# Patient Record
Sex: MALE | Race: WHITE | NOT HISPANIC OR LATINO | Employment: FULL TIME | URBAN - METROPOLITAN AREA
[De-identification: names, ages, dates, MRNs, and addresses within clinical notes are randomized per-mention and may not be internally consistent; named-entity substitution may affect disease eponyms.]

---

## 2019-07-11 ENCOUNTER — TELEPHONE (OUTPATIENT)
Dept: URGENT CARE | Age: 29
End: 2019-07-11

## 2019-07-11 NOTE — TELEPHONE ENCOUNTER
Pt came into have sutures removed, over 50 on his head in total after having hair restoration done on 6/28  Due to the type of procedure that was done pt needs to return to his surgeon for the removal  Discussed with my collaborating physician and agreed that he would have to go to his surgeon and Dr Iveth Hutton also not comfortable removing them  Patient was offered a refund due to being unable to remove them and cancel his registration  Pt verbally stated that he understood and walked out       RENUKA Jacobsen

## 2021-03-31 DIAGNOSIS — Z23 ENCOUNTER FOR IMMUNIZATION: ICD-10-CM

## 2022-03-06 ENCOUNTER — OFFICE VISIT (OUTPATIENT)
Dept: URGENT CARE | Age: 32
End: 2022-03-06
Payer: COMMERCIAL

## 2022-03-06 VITALS
TEMPERATURE: 97.1 F | WEIGHT: 225 LBS | HEIGHT: 69 IN | DIASTOLIC BLOOD PRESSURE: 85 MMHG | SYSTOLIC BLOOD PRESSURE: 139 MMHG | HEART RATE: 73 BPM | BODY MASS INDEX: 33.33 KG/M2 | OXYGEN SATURATION: 97 %

## 2022-03-06 DIAGNOSIS — Z48.02 ENCOUNTER FOR REMOVAL OF SUTURES: Primary | ICD-10-CM

## 2022-03-06 PROCEDURE — 99213 OFFICE O/P EST LOW 20 MIN: CPT | Performed by: NURSE PRACTITIONER

## 2022-03-06 NOTE — PROGRESS NOTES
[facility]        NAME: Dennis Fraga is a 33 y o  male  : 1990    MRN: [id number]  DATE: 2022  TIME: 10:57 AM    Assessment and Plan   Encounter for removal of sutures [Z48 02]  1  Encounter for removal of sutures       Pt tolerated well - will f/u with [facility] as needed  Patient Instructions     Follow up with PCP in 3-5 days  Proceed to  ER if symptoms worsen  Chief Complaint     Chief Complaint   Patient presents with    Suture / Staple Removal         History of Present Illness   Dennis Fraga presents to the clinic c/o    Pt presents to the office for suture removal   Had a hair transplant procedure first week of Feb down at [facility] in [place]  Has dissolvable running sutures across scalp  States has kept them in the 3 weeks recommended but states the pressure and itching is very hard to tolerate anymore   Presents for removal of sutures  Review of Systems   Review of Systems   All other systems reviewed and are negative  Current Medications     Long-Term Medications   Medication Sig Dispense Refill    Multiple Vitamin (MULTIVITAMIN) tablet Take 1 tablet by mouth daily         Current Allergies     Allergies as of 2022    (No Known Allergies)            The following portions of the patient's history were reviewed and updated as appropriate: allergies, current medications, past family history, past medical history, past social history, past surgical history and problem list     Objective   /85   Pulse 73   Temp (!) 97 1 °F (36 2 °C)   Ht 5' 9" (1 753 m)   Wt 102 kg (225 lb)   SpO2 97%   BMI 33 23 kg/m²          Physical Exam     Physical Exam  Constitutional:       Appearance: He is well-developed  HENT:      Head: Normocephalic and atraumatic  Comments: Incision well healed  Well approximated  No drainage, erythema  Running suture removal performed    Eyes:      General: Lids are normal       Conjunctiva/sclera: Conjunctivae normal       Pupils: Pupils are equal, round, and reactive to light  Cardiovascular:      Rate and Rhythm: Normal rate and regular rhythm  Heart sounds: Normal heart sounds, S1 normal and S2 normal    Pulmonary:      Effort: Pulmonary effort is normal       Breath sounds: Normal breath sounds  Skin:     General: Skin is warm and dry  Psychiatric:         Speech: Speech normal          Behavior: Behavior normal          Thought Content: Thought content normal          Judgment: Judgment normal            Suture removal    Date/Time: 3/6/2022 11:01 AM  Performed by: RENUKA Gomez  Authorized by: RENUKA Gomez   Universal Protocol:  Consent: Verbal consent obtained  Risks and benefits: risks, benefits and alternatives were discussed  Time out: Immediately prior to procedure a "time out" was called to verify the correct patient, procedure, equipment, support staff and site/side marked as required  Timeout called at: 3/6/2022 11:01 AM   Patient understanding: patient states understanding of the procedure being performed  Patient consent: the patient's understanding of the procedure matches consent given  Procedure consent: procedure consent matches procedure scheduled  Relevant documents: relevant documents present and verified  Test results: test results available and properly labeled  Site marked: the operative site was marked  Radiology Images displayed and confirmed  If images not available, report reviewed: imaging studies available  Required items: required blood products, implants, devices, and special equipment available  Patient identity confirmed: verbally with patient        Patient location:  Clinic  Location:     Laterality:  Bilateral and median    Location:  [address] [facility] location:  Scalp  Procedure details:      Tools used:  Suture removal kit    Wound appearance:  No sign(s) of infection, good wound healing and clean    Number of sutures removed: 1  Post-procedure details:     Patient tolerance of procedure:   Tolerated well, no immediate complications  Comments:      Running sutures

## 2024-10-14 ENCOUNTER — TELEPHONE (OUTPATIENT)
Age: 34
End: 2024-10-14

## 2024-10-14 NOTE — TELEPHONE ENCOUNTER
Pt called to schedule a colonoscopy. Advised pt he needed a consult due to age. Pt wanted to know if colonoscopy and consult could be the same day. I advised pt they could not be. Pt stated he would just go through [facility].